# Patient Record
Sex: MALE | Race: WHITE | NOT HISPANIC OR LATINO | Employment: FULL TIME | URBAN - METROPOLITAN AREA
[De-identification: names, ages, dates, MRNs, and addresses within clinical notes are randomized per-mention and may not be internally consistent; named-entity substitution may affect disease eponyms.]

---

## 2017-03-28 RX ORDER — METOPROLOL SUCCINATE 50 MG/1
TABLET, EXTENDED RELEASE ORAL
Qty: 60 TABLET | Refills: 2 | Status: SHIPPED | OUTPATIENT
Start: 2017-03-28 | End: 2017-06-28 | Stop reason: SDUPTHER

## 2017-03-28 RX ORDER — RAMIPRIL 5 MG/1
CAPSULE ORAL
Qty: 30 CAPSULE | Refills: 2 | Status: SHIPPED | OUTPATIENT
Start: 2017-03-28 | End: 2017-06-28 | Stop reason: SDUPTHER

## 2017-06-28 RX ORDER — METOPROLOL SUCCINATE 50 MG/1
TABLET, EXTENDED RELEASE ORAL
Qty: 60 TABLET | Refills: 0 | Status: SHIPPED | OUTPATIENT
Start: 2017-06-28 | End: 2017-10-16 | Stop reason: SDUPTHER

## 2017-06-28 RX ORDER — RAMIPRIL 5 MG/1
CAPSULE ORAL
Qty: 30 CAPSULE | Refills: 0 | Status: SHIPPED | OUTPATIENT
Start: 2017-06-28 | End: 2017-10-16 | Stop reason: SDUPTHER

## 2017-09-25 RX ORDER — ATORVASTATIN CALCIUM 80 MG/1
TABLET, FILM COATED ORAL
Qty: 90 TABLET | Refills: 0 | Status: SHIPPED | OUTPATIENT
Start: 2017-09-25 | End: 2018-06-22

## 2017-10-16 RX ORDER — RAMIPRIL 5 MG/1
CAPSULE ORAL
Qty: 30 CAPSULE | Refills: 0 | Status: SHIPPED | OUTPATIENT
Start: 2017-10-16 | End: 2017-11-19 | Stop reason: SDUPTHER

## 2017-10-16 RX ORDER — METOPROLOL SUCCINATE 50 MG/1
TABLET, EXTENDED RELEASE ORAL
Qty: 60 TABLET | Refills: 0 | Status: SHIPPED | OUTPATIENT
Start: 2017-10-16 | End: 2017-11-19 | Stop reason: SDUPTHER

## 2017-11-20 RX ORDER — RAMIPRIL 5 MG/1
CAPSULE ORAL
Qty: 30 CAPSULE | Refills: 0 | Status: SHIPPED | OUTPATIENT
Start: 2017-11-20 | End: 2018-01-24 | Stop reason: SDUPTHER

## 2017-11-20 RX ORDER — METOPROLOL SUCCINATE 50 MG/1
TABLET, EXTENDED RELEASE ORAL
Qty: 60 TABLET | Refills: 0 | Status: SHIPPED | OUTPATIENT
Start: 2017-11-20 | End: 2018-01-24 | Stop reason: SDUPTHER

## 2018-01-24 ENCOUNTER — OFFICE VISIT (OUTPATIENT)
Dept: FAMILY MEDICINE CLINIC | Facility: CLINIC | Age: 63
End: 2018-01-24

## 2018-01-24 VITALS
TEMPERATURE: 97.5 F | SYSTOLIC BLOOD PRESSURE: 138 MMHG | OXYGEN SATURATION: 93 % | HEIGHT: 72 IN | HEART RATE: 73 BPM | WEIGHT: 280 LBS | DIASTOLIC BLOOD PRESSURE: 84 MMHG | BODY MASS INDEX: 37.93 KG/M2

## 2018-01-24 DIAGNOSIS — R73.01 IMPAIRED FASTING GLUCOSE: ICD-10-CM

## 2018-01-24 DIAGNOSIS — E78.49 OTHER HYPERLIPIDEMIA: ICD-10-CM

## 2018-01-24 DIAGNOSIS — N40.0 BENIGN PROSTATIC HYPERPLASIA WITHOUT LOWER URINARY TRACT SYMPTOMS: Primary | ICD-10-CM

## 2018-01-24 DIAGNOSIS — I10 BENIGN ESSENTIAL HYPERTENSION: ICD-10-CM

## 2018-01-24 LAB
ALBUMIN SERPL-MCNC: 4.4 G/DL (ref 3.5–5.2)
ALBUMIN/GLOB SERPL: 1.4 G/DL
ALP SERPL-CCNC: 80 U/L (ref 39–117)
ALT SERPL-CCNC: 25 U/L (ref 1–41)
AST SERPL-CCNC: 25 U/L (ref 1–40)
BILIRUB SERPL-MCNC: 0.3 MG/DL (ref 0.1–1.2)
BUN SERPL-MCNC: 21 MG/DL (ref 8–23)
BUN/CREAT SERPL: 18.9 (ref 7–25)
CALCIUM SERPL-MCNC: 9.2 MG/DL (ref 8.6–10.5)
CHLORIDE SERPL-SCNC: 101 MMOL/L (ref 98–107)
CHOLEST SERPL-MCNC: 170 MG/DL (ref 0–200)
CO2 SERPL-SCNC: 27.1 MMOL/L (ref 22–29)
CREAT SERPL-MCNC: 1.11 MG/DL (ref 0.76–1.27)
GFR SERPLBLD CREATININE-BSD FMLA CKD-EPI: 67 ML/MIN/1.73
GFR SERPLBLD CREATININE-BSD FMLA CKD-EPI: 81 ML/MIN/1.73
GLOBULIN SER CALC-MCNC: 3.1 GM/DL
GLUCOSE SERPL-MCNC: 104 MG/DL (ref 65–99)
HBA1C MFR BLD: 5.6 % (ref 4.8–5.6)
HDLC SERPL-MCNC: 36 MG/DL (ref 40–60)
LDLC SERPL CALC-MCNC: 98 MG/DL (ref 0–100)
POTASSIUM SERPL-SCNC: 4.6 MMOL/L (ref 3.5–5.2)
PROT SERPL-MCNC: 7.5 G/DL (ref 6–8.5)
PSA SERPL-MCNC: 2.3 NG/ML (ref 0–4)
SODIUM SERPL-SCNC: 140 MMOL/L (ref 136–145)
TRIGL SERPL-MCNC: 179 MG/DL (ref 0–150)
VLDLC SERPL CALC-MCNC: 35.8 MG/DL (ref 5–40)

## 2018-01-24 PROCEDURE — 69209 REMOVE IMPACTED EAR WAX UNI: CPT | Performed by: NURSE PRACTITIONER

## 2018-01-24 PROCEDURE — 99214 OFFICE O/P EST MOD 30 MIN: CPT | Performed by: NURSE PRACTITIONER

## 2018-01-24 RX ORDER — METOPROLOL SUCCINATE 50 MG/1
100 TABLET, EXTENDED RELEASE ORAL DAILY
Qty: 60 TABLET | Refills: 5 | Status: SHIPPED | OUTPATIENT
Start: 2018-01-24 | End: 2018-11-04 | Stop reason: SDUPTHER

## 2018-01-24 RX ORDER — RAMIPRIL 5 MG/1
5 CAPSULE ORAL DAILY
Qty: 30 CAPSULE | Refills: 5 | Status: SHIPPED | OUTPATIENT
Start: 2018-01-24 | End: 2018-11-04 | Stop reason: SDUPTHER

## 2018-01-24 NOTE — PROGRESS NOTES
"Subjective   Gordon Gutierrez is a 62 y.o. male who presents for a follow up hypertension, hyperlipidemia. Here for routine check up and blood work and would like ears cleaned out as well r/t being clogged up.  History of Present Illness   No acute complaints. Denies chest pain, sees Dr. Kevin annually for follow up.   The following portions of the patient's history were reviewed and updated as appropriate: allergies, current medications, past family history, past medical history, past social history, past surgical history and problem list.    Review of Systems   Constitutional: Negative.    HENT: Positive for hearing loss (due to ears being clogged up-supposed to have cleaned twice year ).    Respiratory: Negative.    Cardiovascular: Negative.         Denies any need for ntg sl r/t never had chest pain but carries r/t Dr. Kevin providing them.   Gastrointestinal: Negative.    Endocrine: Negative.    Genitourinary: Negative.    Musculoskeletal: Positive for arthralgias (resolves upon movement-takes osteobiflex) and myalgias (normal joint/muscle pains upon wakening from bed-resolves upon movement).   Allergic/Immunologic: Negative.    Neurological: Negative.    Hematological: Negative.    Psychiatric/Behavioral: Negative.    /84  Pulse 73  Temp 97.5 °F (36.4 °C) (Oral)   Ht 182.9 cm (72\")  Wt 127 kg (280 lb)  SpO2 93%  BMI 37.97 kg/m2      Objective   Physical Exam   Constitutional: He is oriented to person, place, and time. He appears well-developed and well-nourished.   HENT:   Head: Normocephalic and atraumatic.   Right Ear: External ear normal. No swelling or tenderness.   Left Ear: External ear normal. No swelling or tenderness.   Mouth/Throat: Oropharynx is clear and moist.   hannah cerumen plugs     Neck: Normal range of motion. Neck supple. No thyromegaly present.   Cardiovascular: Normal rate, regular rhythm and normal heart sounds.    Pulses:       Carotid pulses are 2+ on the right side, and 2+ on the " left side.  Pulmonary/Chest: Effort normal and breath sounds normal.   Lymphadenopathy:     He has no cervical adenopathy.   Neurological: He is alert and oriented to person, place, and time.   Skin: Skin is warm and dry. He is not diaphoretic.   Psychiatric: He has a normal mood and affect. His behavior is normal. Judgment and thought content normal.   Vitals reviewed.    Assessment/Plan   Problems Addressed this Visit        Cardiovascular and Mediastinum    Benign essential hypertension    Relevant Medications    metoprolol succinate XL (TOPROL-XL) 50 MG 24 hr tablet    ramipril (ALTACE) 5 MG capsule    Other Relevant Orders    Comprehensive Metabolic Panel    Hyperlipidemia    Relevant Orders    Lipid panel       Endocrine    Impaired fasting glucose    Relevant Orders    Hemoglobin A1c       Genitourinary    Benign prostatic hyperplasia - Primary    Relevant Orders    PSA Screen        Ears clear readily with irrigation. No trauma noted.   Update labs, consider crestor if lipids not fully controlled. With LDL<70  Update A1c, recommend diet and weight loss.   FU annually and prn

## 2018-01-25 NOTE — PROGRESS NOTES
Please call the patient regarding his abnormal result. Blood glucose with mild elevation, but no diabetes. Prostate number normal. Cholesterol only mildly elevated, but not to goal. Consider switching atorvastatin to crestor 20mg 1 po qd #30 5rf, recheck lipids with cmp in 6 months, otherwise 1 yr follow up.

## 2018-01-29 ENCOUNTER — TELEPHONE (OUTPATIENT)
Dept: FAMILY MEDICINE CLINIC | Facility: CLINIC | Age: 63
End: 2018-01-29

## 2018-01-29 NOTE — TELEPHONE ENCOUNTER
----- Message from JORDANA Christian sent at 1/25/2018  8:07 AM EST -----  Please call the patient regarding his abnormal result. Blood glucose with mild elevation, but no diabetes. Prostate number normal. Cholesterol only mildly elevated, but not to goal. Consider switching atorvastatin to crestor 20mg 1 po qd #30 5rf, recheck lipids with cmp in 6 months, otherwise 1 yr follow up.

## 2018-01-29 NOTE — TELEPHONE ENCOUNTER
Patient notified of results. He states he will switch to crestor once the current 90 day supply of atorvastatin that he has on hand runs out. He will call when he needs rx changed.

## 2018-06-22 RX ORDER — ROSUVASTATIN CALCIUM 20 MG/1
20 TABLET, COATED ORAL DAILY
Qty: 90 TABLET | Refills: 0 | Status: SHIPPED | OUTPATIENT
Start: 2018-06-22 | End: 2018-11-04 | Stop reason: SDUPTHER

## 2018-06-22 RX ORDER — ATORVASTATIN CALCIUM 80 MG/1
TABLET, FILM COATED ORAL
Qty: 90 TABLET | Refills: 0 | OUTPATIENT
Start: 2018-06-22

## 2018-11-04 DIAGNOSIS — I10 BENIGN ESSENTIAL HYPERTENSION: ICD-10-CM

## 2018-11-05 RX ORDER — RAMIPRIL 5 MG/1
CAPSULE ORAL
Qty: 90 CAPSULE | Refills: 0 | Status: SHIPPED | OUTPATIENT
Start: 2018-11-05 | End: 2019-04-17 | Stop reason: SDUPTHER

## 2018-11-05 RX ORDER — ROSUVASTATIN CALCIUM 20 MG/1
TABLET, COATED ORAL
Qty: 90 TABLET | Refills: 0 | Status: SHIPPED | OUTPATIENT
Start: 2018-11-05 | End: 2019-04-17

## 2018-11-05 RX ORDER — METOPROLOL SUCCINATE 50 MG/1
TABLET, EXTENDED RELEASE ORAL
Qty: 180 TABLET | Refills: 0 | Status: SHIPPED | OUTPATIENT
Start: 2018-11-05 | End: 2019-04-17 | Stop reason: SDUPTHER

## 2019-04-06 DIAGNOSIS — I10 BENIGN ESSENTIAL HYPERTENSION: ICD-10-CM

## 2019-04-09 RX ORDER — METOPROLOL SUCCINATE 50 MG/1
TABLET, EXTENDED RELEASE ORAL
Qty: 60 TABLET | Refills: 0 | OUTPATIENT
Start: 2019-04-09

## 2019-04-09 RX ORDER — RAMIPRIL 5 MG/1
CAPSULE ORAL
Qty: 30 CAPSULE | Refills: 0 | OUTPATIENT
Start: 2019-04-09

## 2019-04-17 ENCOUNTER — OFFICE VISIT (OUTPATIENT)
Dept: FAMILY MEDICINE CLINIC | Facility: CLINIC | Age: 64
End: 2019-04-17

## 2019-04-17 VITALS
TEMPERATURE: 98.2 F | HEIGHT: 72 IN | WEIGHT: 274 LBS | DIASTOLIC BLOOD PRESSURE: 68 MMHG | SYSTOLIC BLOOD PRESSURE: 124 MMHG | OXYGEN SATURATION: 98 % | BODY MASS INDEX: 37.11 KG/M2 | HEART RATE: 60 BPM

## 2019-04-17 DIAGNOSIS — B36.9 FUNGAL DERMATITIS: ICD-10-CM

## 2019-04-17 DIAGNOSIS — N40.0 BENIGN PROSTATIC HYPERPLASIA WITHOUT LOWER URINARY TRACT SYMPTOMS: ICD-10-CM

## 2019-04-17 DIAGNOSIS — I10 BENIGN ESSENTIAL HYPERTENSION: Primary | ICD-10-CM

## 2019-04-17 LAB — PSA SERPL-MCNC: 2.89 NG/ML (ref 0–4)

## 2019-04-17 PROCEDURE — 99214 OFFICE O/P EST MOD 30 MIN: CPT | Performed by: NURSE PRACTITIONER

## 2019-04-17 RX ORDER — RAMIPRIL 5 MG/1
5 CAPSULE ORAL DAILY
Qty: 90 CAPSULE | Refills: 3 | Status: SHIPPED | OUTPATIENT
Start: 2019-04-17 | End: 2020-06-16

## 2019-04-17 RX ORDER — BETAMETHASONE DIPROPIONATE 0.5 MG/G
CREAM TOPICAL 2 TIMES DAILY
Qty: 15 G | Refills: 1 | Status: SHIPPED | OUTPATIENT
Start: 2019-04-17 | End: 2020-03-16

## 2019-04-17 RX ORDER — ROSUVASTATIN CALCIUM 20 MG/1
20 TABLET, COATED ORAL DAILY
Qty: 90 TABLET | Refills: 3 | Status: SHIPPED | OUTPATIENT
Start: 2019-04-17 | End: 2020-06-16

## 2019-04-17 RX ORDER — CLOTRIMAZOLE 1 %
CREAM (GRAM) TOPICAL 2 TIMES DAILY
Qty: 24 G | Refills: 0 | Status: SHIPPED | OUTPATIENT
Start: 2019-04-17 | End: 2020-03-16

## 2019-04-17 RX ORDER — METOPROLOL SUCCINATE 50 MG/1
100 TABLET, EXTENDED RELEASE ORAL DAILY
Qty: 180 TABLET | Refills: 3 | Status: SHIPPED | OUTPATIENT
Start: 2019-04-17 | End: 2020-06-16

## 2019-04-17 NOTE — PROGRESS NOTES
"Subjective   Gordon Gutierrez is a 63 y.o. male who presents for a routine follow up on hypertension.     Hypertension   This is a chronic problem. The current episode started more than 1 year ago. The problem is unchanged. The problem is controlled. Associated symptoms include palpitations (had testing with cardiology, told all normal, occurs intermittently). Pertinent negatives include no chest pain, peripheral edema or shortness of breath. Agents associated with hypertension include NSAIDs. Risk factors for coronary artery disease include dyslipidemia, family history, male gender, obesity and smoking/tobacco exposure. Past treatments include beta blockers and ACE inhibitors. Current antihypertension treatment includes beta blockers and ACE inhibitors. The current treatment provides significant improvement. Compliance problems include exercise.  Hypertensive end-organ damage includes CAD/MI.    place under arm x 4 months itching    The following portions of the patient's history were reviewed and updated as appropriate: allergies, current medications, past family history, past medical history, past social history, past surgical history and problem list.    Review of Systems   Constitutional: Positive for activity change (working 3 days a week. ). Negative for fever and unexpected weight gain.   HENT: Negative.    Respiratory: Negative for shortness of breath.    Cardiovascular: Positive for palpitations (had testing with cardiology, told all normal, occurs intermittently). Negative for chest pain.   Gastrointestinal: Negative.    Genitourinary: Negative for difficulty urinating, hematuria and nocturia.   Musculoskeletal: Positive for arthralgias.   Skin: Positive for skin lesions.   Neurological: Negative.    Psychiatric/Behavioral: Negative.      /68   Pulse 60   Temp 98.2 °F (36.8 °C) (Oral)   Ht 182.9 cm (72\")   Wt 124 kg (274 lb)   SpO2 98%   BMI 37.16 kg/m²     Objective   Physical Exam   Constitutional: " He is oriented to person, place, and time. He appears well-developed and well-nourished.   Neck: Normal range of motion. Neck supple. No tracheal deviation present. No thyromegaly present.   Cardiovascular: Normal rate, regular rhythm and normal heart sounds.   Pulses:       Carotid pulses are 2+ on the right side, and 2+ on the left side.  Pulmonary/Chest: Effort normal and breath sounds normal.   Lymphadenopathy:     He has no cervical adenopathy.   Neurological: He is alert and oriented to person, place, and time.   Skin: Skin is warm and dry. Capillary refill takes less than 2 seconds. There is erythema.   Macerated area to R axilla with redness   Psychiatric: He has a normal mood and affect. His behavior is normal. Judgment and thought content normal.   Nursing note and vitals reviewed.    Assessment/Plan   Problems Addressed this Visit        Cardiovascular and Mediastinum    Benign essential hypertension - Primary    Relevant Medications    ramipril (ALTACE) 5 MG capsule    metoprolol succinate XL (TOPROL-XL) 50 MG 24 hr tablet       Genitourinary    Benign prostatic hyperplasia    Relevant Orders    PSA DIAGNOSTIC      Other Visit Diagnoses     Fungal dermatitis        Relevant Medications    clotrimazole (LOTRIMIN) 1 % cream    betamethasone dipropionate (DIPROLENE) 0.05 % cream        HTN--Reviewed cardiology notes--Had holter with sinus arrythmia and PACs, Echo with EF 61% mild valvular dysfunction. TSH was normal. Cholesterol unchanged on crestor. Will continue.   BPH--update PSA  Fungal dermatitis--treat for 2 weeks, follow up if not settling.  Otherwise annual FU    Patient has been erroneously marked as diabetic. Based on the available clinical information, he does not have diabetes and should therefore be excluded from diabetic health maintenance and quality measures for the remainder of the reporting period.

## 2020-03-16 ENCOUNTER — OFFICE VISIT (OUTPATIENT)
Dept: FAMILY MEDICINE CLINIC | Facility: CLINIC | Age: 65
End: 2020-03-16

## 2020-03-16 VITALS
HEART RATE: 68 BPM | SYSTOLIC BLOOD PRESSURE: 134 MMHG | DIASTOLIC BLOOD PRESSURE: 82 MMHG | BODY MASS INDEX: 39.28 KG/M2 | WEIGHT: 290 LBS | HEIGHT: 72 IN | OXYGEN SATURATION: 97 % | TEMPERATURE: 98.6 F

## 2020-03-16 DIAGNOSIS — E78.49 OTHER HYPERLIPIDEMIA: ICD-10-CM

## 2020-03-16 DIAGNOSIS — M79.662 BILATERAL CALF PAIN: ICD-10-CM

## 2020-03-16 DIAGNOSIS — E66.01 MORBID OBESITY (HCC): ICD-10-CM

## 2020-03-16 DIAGNOSIS — E55.9 VITAMIN D DEFICIENCY: ICD-10-CM

## 2020-03-16 DIAGNOSIS — K76.0 STEATOSIS OF LIVER: ICD-10-CM

## 2020-03-16 DIAGNOSIS — Z00.00 WELCOME TO MEDICARE PREVENTIVE VISIT: ICD-10-CM

## 2020-03-16 DIAGNOSIS — G47.33 OSA (OBSTRUCTIVE SLEEP APNEA): ICD-10-CM

## 2020-03-16 DIAGNOSIS — I25.10 CHRONIC CORONARY ARTERY DISEASE: Primary | ICD-10-CM

## 2020-03-16 DIAGNOSIS — M79.661 BILATERAL CALF PAIN: ICD-10-CM

## 2020-03-16 DIAGNOSIS — R73.01 IMPAIRED FASTING GLUCOSE: ICD-10-CM

## 2020-03-16 DIAGNOSIS — I47.1 PAROXYSMAL SUPRAVENTRICULAR TACHYCARDIA (HCC): ICD-10-CM

## 2020-03-16 DIAGNOSIS — Z23 IMMUNIZATION DUE: ICD-10-CM

## 2020-03-16 DIAGNOSIS — N40.0 BENIGN PROSTATIC HYPERPLASIA WITHOUT LOWER URINARY TRACT SYMPTOMS: ICD-10-CM

## 2020-03-16 DIAGNOSIS — Z87.891 FORMER SMOKER: ICD-10-CM

## 2020-03-16 LAB
25(OH)D3+25(OH)D2 SERPL-MCNC: 24.6 NG/ML (ref 30–100)
ALBUMIN SERPL-MCNC: 4.1 G/DL (ref 3.5–5.2)
ALBUMIN/GLOB SERPL: 1.3 G/DL
ALP SERPL-CCNC: 66 U/L (ref 39–117)
ALT SERPL-CCNC: 39 U/L (ref 1–41)
AST SERPL-CCNC: 32 U/L (ref 1–40)
BASOPHILS # BLD AUTO: ABNORMAL 10*3/UL
BASOPHILS # BLD MANUAL: 0 10*3/MM3 (ref 0–0.2)
BASOPHILS NFR BLD MANUAL: 0 % (ref 0–1.5)
BILIRUB SERPL-MCNC: 0.2 MG/DL (ref 0.2–1.2)
BUN SERPL-MCNC: 24 MG/DL (ref 8–23)
BUN/CREAT SERPL: 17 (ref 7–25)
CALCIUM SERPL-MCNC: 8.6 MG/DL (ref 8.6–10.5)
CHLORIDE SERPL-SCNC: 106 MMOL/L (ref 98–107)
CHOLEST SERPL-MCNC: 162 MG/DL (ref 0–200)
CO2 SERPL-SCNC: 23.1 MMOL/L (ref 22–29)
CREAT SERPL-MCNC: 1.41 MG/DL (ref 0.76–1.27)
DIFFERENTIAL COMMENT: NORMAL
EOSINOPHIL # BLD AUTO: ABNORMAL 10*3/UL
EOSINOPHIL # BLD MANUAL: 0.29 10*3/MM3 (ref 0–0.4)
EOSINOPHIL NFR BLD AUTO: ABNORMAL %
EOSINOPHIL NFR BLD MANUAL: 4 % (ref 0.3–6.2)
ERYTHROCYTE [DISTWIDTH] IN BLOOD BY AUTOMATED COUNT: 13.9 % (ref 12.3–15.4)
GLOBULIN SER CALC-MCNC: 3.2 GM/DL
GLUCOSE SERPL-MCNC: 114 MG/DL (ref 65–99)
HBA1C MFR BLD: 7.1 % (ref 4.8–5.6)
HCT VFR BLD AUTO: 33.4 % (ref 37.5–51)
HDLC SERPL-MCNC: 32 MG/DL (ref 40–60)
HGB BLD-MCNC: 11.3 G/DL (ref 13–17.7)
LDLC SERPL CALC-MCNC: 84 MG/DL (ref 0–100)
LYMPHOCYTES # BLD AUTO: ABNORMAL 10*3/UL
LYMPHOCYTES # BLD MANUAL: 2.32 10*3/MM3 (ref 0.7–3.1)
LYMPHOCYTES NFR BLD AUTO: ABNORMAL %
LYMPHOCYTES NFR BLD MANUAL: 32 % (ref 19.6–45.3)
MAGNESIUM SERPL-MCNC: 2 MG/DL (ref 1.6–2.4)
MCH RBC QN AUTO: 30.2 PG (ref 26.6–33)
MCHC RBC AUTO-ENTMCNC: 33.8 G/DL (ref 31.5–35.7)
MCV RBC AUTO: 89.3 FL (ref 79–97)
MONOCYTES # BLD MANUAL: 0.8 10*3/MM3 (ref 0.1–0.9)
MONOCYTES NFR BLD AUTO: ABNORMAL %
MONOCYTES NFR BLD MANUAL: 11 % (ref 5–12)
NEUTROPHILS # BLD MANUAL: 3.84 10*3/MM3 (ref 1.7–7)
NEUTROPHILS NFR BLD AUTO: ABNORMAL %
NEUTROPHILS NFR BLD MANUAL: 53 % (ref 42.7–76)
PLATELET # BLD AUTO: 206 10*3/MM3 (ref 140–450)
PLATELET BLD QL SMEAR: NORMAL
POTASSIUM SERPL-SCNC: 4.6 MMOL/L (ref 3.5–5.2)
PROT SERPL-MCNC: 7.3 G/DL (ref 6–8.5)
PSA SERPL-MCNC: 2.31 NG/ML (ref 0–4)
RBC # BLD AUTO: 3.74 10*6/MM3 (ref 4.14–5.8)
RBC MORPH BLD: NORMAL
SODIUM SERPL-SCNC: 142 MMOL/L (ref 136–145)
TRIGL SERPL-MCNC: 229 MG/DL (ref 0–150)
VLDLC SERPL CALC-MCNC: 45.8 MG/DL
WBC # BLD AUTO: 7.24 10*3/MM3 (ref 3.4–10.8)

## 2020-03-16 PROCEDURE — G0402 INITIAL PREVENTIVE EXAM: HCPCS | Performed by: NURSE PRACTITIONER

## 2020-03-16 PROCEDURE — 99214 OFFICE O/P EST MOD 30 MIN: CPT | Performed by: NURSE PRACTITIONER

## 2020-03-16 RX ORDER — ROSUVASTATIN CALCIUM 20 MG/1
20 TABLET, COATED ORAL DAILY
COMMUNITY
Start: 2018-11-05 | End: 2020-03-16 | Stop reason: SDUPTHER

## 2020-03-16 NOTE — PATIENT INSTRUCTIONS
Medicare Wellness  Personal Prevention Plan of Service     Date of Office Visit:  2020  Encounter Provider:  JORDANA Moon  Place of Service:  [facility]  Patient Name: Gordon Gutierrez  :  1955    As part of the Medicare Wellness portion of your visit today, we are providing you with this personalized preventive plan of services (PPPS). This plan is based upon recommendations of the [place] Preventive Services Task Force (USPSTF) and the Advisory Committee on Immunization Practices (ACIP).    This lists the preventive care services that should be considered, and provides dates of when you are due. Items listed as completed are up-to-date and do not require any further intervention.    Health Maintenance   Topic Date Due   • DIABETIC FOOT EXAM  2017   • URINE MICROALBUMIN  2017   • HEMOGLOBIN A1C  2018   • Pneumococcal Vaccine Once at 65 Years Old  2019   • LIPID PANEL  2019   • AAA SCREEN (ONE-TIME)  2020   • DIABETIC EYE EXAM  2020   • MEDICARE ANNUAL WELLNESS  2021   • COLONOSCOPY  2024   • TDAP/TD VACCINES (3 - Td) 2027   • INFLUENZA VACCINE  Completed   • HEPATITIS C SCREENING  Addressed       Orders Placed This Encounter   Procedures   • Comprehensive Metabolic Panel   • Hemoglobin A1c   • Lipid Panel   • PSA Screen   • Vitamin D 25 Hydroxy   • Magnesium   • CBC & Differential     Order Specific Question:   Manual Differential     Answer:   No       No follow-ups on file.

## 2020-03-16 NOTE — PROGRESS NOTES
The ABCs of the Annual Wellness Visit  [place] to Medicare Visit    No chief complaint on file.      Subjective   History of Present Illness:  Gordon Gutierrez is a 64 y.o. male who presents for a Welcome Medicare Wellness Visit.    HEALTH RISK ASSESSMENT    Recent Hospitalizations:  No hospitalization(s) within the last year.    Current Medical Providers:  Patient Care Team:  Arden Olivares DO as PCP - General (Family Medicine)    Smoking Status:  Social History     Tobacco Use   Smoking Status Former Smoker   • Last attempt to quit: 1996   • Years since quittin.1       Alcohol Consumption:  Social History     Substance and Sexual Activity   Alcohol Use Yes       Depression Screen:   PHQ-2/PHQ-9 Depression Screening 3/16/2020   Little interest or pleasure in doing things 0   Feeling down, depressed, or hopeless 0   Trouble falling or staying asleep, or sleeping too much 2   Feeling tired or having little energy 3   Poor appetite or overeating 0   Feeling bad about yourself - or that you are a failure or have let yourself or your family down 0   Trouble concentrating on things, such as reading the newspaper or watching television 0   Moving or speaking so slowly that other people could have noticed. Or the opposite - being so fidgety or restless that you have been moving around a lot more than usual 0   Thoughts that you would be better off dead, or of hurting yourself in some way 0   Total Score 5   If you checked off any problems, how difficult have these problems made it for you to do your work, take care of things at home, or get along with other people? Somewhat difficult       Fall Risk Screen:  STEADI Fall Risk Assessment has not been completed.    Health Habits and Functional and Cognitive Screening:  Functional & Cognitive Status 3/16/2020   Do you have difficulty preparing food and eating? No   Do you have difficulty bathing yourself, getting dressed or grooming yourself? No   Do you have difficulty  using the toilet? No   Do you have difficulty moving around from place to place? No   Do you have trouble with steps or getting out of a bed or a chair? No   Current Diet Unhealthy Diet   Dental Exam Up to date   Eye Exam Up to date   Exercise (times per week) 0 times per week   Current Exercise Activities Include No Regular Exercise   Do you need help using the phone?  No   Are you deaf or do you have serious difficulty hearing?  No   Do you need help with transportation? No   Do you need help shopping? No   Do you need help preparing meals?  No   Do you need help with housework?  No   Do you need help with laundry? No   Do you need help taking your medications? No   Do you need help managing money? No   Do you ever drive or ride in a car without wearing a seat belt? Yes   Have you felt unusual stress, anger or loneliness in the last month? No   Who do you live with? Spouse   If you need help, do you have trouble finding someone available to you? No   Have you been bothered in the last four weeks by sexual problems? No   Do you have difficulty concentrating, remembering or making decisions? No         Does the patient have evidence of cognitive impairment? No    Asprin use counseling:Taking ASA appropriately as indicated    Age-appropriate Screening Schedule:  Refer to the list below for future screening recommendations based on patient's age, sex and/or medical conditions. Orders for these recommended tests are listed in the plan section. The patient has been provided with a written plan.    Health Maintenance   Topic Date Due   • DIABETIC FOOT EXAM  11/08/2017   • URINE MICROALBUMIN  11/08/2017   • HEMOGLOBIN A1C  07/24/2018   • LIPID PANEL  12/31/2019   • DIABETIC EYE EXAM  09/01/2020   • COLONOSCOPY  01/01/2024   • TDAP/TD VACCINES (3 - Td) 06/12/2027   • INFLUENZA VACCINE  Completed          The following portions of the patient's history were reviewed and updated as appropriate: allergies, current  medications, past family history, past medical history, past social history, past surgical history and problem list.    Outpatient Medications Prior to Visit   Medication Sig Dispense Refill   • aspirin 81 MG tablet Take by mouth.     • magnesium oxide (MAGOX) 400 (241.3 Mg) MG tablet tablet Take 400 mg by mouth Daily.     • metoprolol succinate XL (TOPROL-XL) 50 MG 24 hr tablet Take 2 tablets by mouth Daily. 180 tablet 3   • Misc Natural Products (OSTEO BI-FLEX JOINT SHIELD PO) Take  by mouth.     • nitroglycerin (NITROSTAT) 0.4 MG SL tablet Place 0.4 mg under the tongue Every 5 (Five) Minutes.     • ramipril (ALTACE) 5 MG capsule Take 1 capsule by mouth Daily. 90 capsule 3   • rosuvastatin (CRESTOR) 20 MG tablet Take 1 tablet by mouth Daily. 90 tablet 3   • rosuvastatin (CRESTOR) 20 MG tablet Take 20 mg by mouth Daily.     • betamethasone dipropionate (DIPROLENE) 0.05 % cream Apply  topically to the appropriate area as directed 2 (Two) Times a Day. 15 g 1   • clotrimazole (LOTRIMIN) 1 % cream Apply  topically to the appropriate area as directed 2 (Two) Times a Day. 24 g 0   • Diclofenac (ZORVOLEX) 18 MG capsule        No facility-administered medications prior to visit.        Patient Active Problem List   Diagnosis   • Abdominal pain   • Angina pectoris (CMS/HCC)   • Aortic valve insufficiency   • Chronic coronary artery disease   • Benign essential hypertension   • Benign prostatic hyperplasia   • Cardiomyopathy (CMS/HCC)   • Dyslipidemia   • Erectile dysfunction of nonorganic origin   • Steatosis of liver   • Flank pain   • Generalized osteoarthritis   • Hematuria   • Impaired fasting glucose   • Low back pain   • Morbid obesity (CMS/HCC)   • Paroxysmal supraventricular tachycardia (CMS/HCC)   • Tricuspid valve insufficiency   • Vitamin D deficiency   • ISIS (obstructive sleep apnea)   • Dyspnea on exertion   • Hyperlipidemia       Advanced Care Planning:  ACP discussion was held with the patient during this  "visit. Patient has an advance directive (not in EMR), copy requested.    Review of Systems    Compared to one year ago, the patient feels his physical health is the same.  Compared to one year ago, the patient feels his mental health is the same.    Reviewed chart for potential of high risk medication in the elderly: yes  Reviewed chart for potential of harmful drug interactions in the elderly:yes    Objective         Vitals:    03/16/20 0845   BP: 134/82   Pulse: 68   Temp: 98.6 °F (37 °C)   TempSrc: Oral   SpO2: 97%   Weight: 132 kg (290 lb)   Height: 182.9 cm (72\")   PainSc:   3   PainLoc: Leg       Body mass index is 39.33 kg/m².  Discussed the patient's BMI with him. The BMI is above average; BMI management plan is completed.    Physical Exam    Lab Results   Component Value Date     (H) 03/11/2020        Assessment/Plan   Medicare Risks and Personalized Health Plan  CMS Preventative Services Quick Reference  Abdominal Aortic Aneurysm Screening  Advance Directive Discussion  Cardiovascular risk  Diabetic Lab Screening   Immunizations Discussed/Encouraged (specific immunizations; Pneumococcal 23 )    The above risks/problems have been discussed with the patient.  Pertinent information has been shared with the patient in the After Visit Summary.  Follow up plans and orders are seen below in the Assessment/Plan Section.    Diagnoses and all orders for this visit:    1. Chronic coronary artery disease (Primary)  -     CBC & Differential  -     Comprehensive Metabolic Panel    2. Other hyperlipidemia  -     Comprehensive Metabolic Panel  -     Lipid Panel    3. Steatosis of liver    4. Morbid obesity (CMS/HCC)    5. Vitamin D deficiency  -     Vitamin D 25 Hydroxy    6. Impaired fasting glucose  -     Hemoglobin A1c    7. Benign prostatic hyperplasia without lower urinary tract symptoms  -     PSA Screen    8. Paroxysmal supraventricular tachycardia (CMS/HCC)  -     Magnesium    9. ISIS (obstructive sleep " apnea)    10. Immunization due  -     pneumococcal polysaccharide 23-valent (PNEUMOVAX-23) vaccine 0.5 mL    11. Former smoker  -     Abdominal Aortic Aneurysm Screening Medicare CAR; Future    12. Welcome to Medicare preventive visit      Follow Up:  No follow-ups on file.     An After Visit Summary and PPPS were given to the patient.

## 2020-03-16 NOTE — PROGRESS NOTES
"Subjective   Gordon Gutierrez is a 64 y.o. male who presents for a routine follow up on hypertension, hyperlipidemia.     History of Present Illness   Having increased issues with SVT that occurs up to 5-6 x daily, though less since quit caffeine. Having fatigue and lack of motivation to rehab shoulder from rotator cuff injury.   Reviewed his labs from cardiology and kidney function is declining--47, has hematuria in the past--had scopes and told negative.   Legs bothering him, sore in calves and shins--no swelling x 2 weeks. More sore with walking, but hurts from time starts walking until quits. No back pain, but R shoulder hurts with walk.   Did smoke just over 30 pack years, quit in 1996  The following portions of the patient's history were reviewed and updated as appropriate: allergies, current medications, past family history, past medical history, past social history, past surgical history and problem list.    Review of Systems   Constitutional: Positive for activity change and fatigue. Negative for chills, fever and unexpected weight gain.   HENT: Negative.    Eyes: Negative.    Respiratory: Negative.    Cardiovascular: Positive for palpitations. Negative for chest pain and leg swelling.   Gastrointestinal: Negative.    Endocrine: Negative.    Genitourinary: Positive for nocturia (1 x nightly). Negative for difficulty urinating.   Musculoskeletal: Positive for arthralgias, back pain and myalgias. Negative for gait problem, joint swelling, neck pain and neck stiffness.   Allergic/Immunologic: Negative.    Neurological: Negative.    Hematological: Negative.    Psychiatric/Behavioral: Negative.      /82   Pulse 68   Temp 98.6 °F (37 °C) (Oral)   Ht 182.9 cm (72\")   Wt 132 kg (290 lb)   SpO2 97%   BMI 39.33 kg/m²     Objective   Physical Exam   Constitutional: He is oriented to person, place, and time. He appears well-developed and well-nourished.   Neck: Normal range of motion. Neck supple. No tracheal " deviation present. No thyromegaly present.   Cardiovascular: Normal rate, regular rhythm and normal heart sounds.   Pulses:       Carotid pulses are 2+ on the right side, and 2+ on the left side.  Pulmonary/Chest: Effort normal and breath sounds normal.   Musculoskeletal: He exhibits tenderness (calves mild tender, negative tiny). He exhibits no edema or deformity.   Lymphadenopathy:     He has no cervical adenopathy.   Neurological: He is alert and oriented to person, place, and time.   Skin: Skin is warm and dry. Capillary refill takes less than 2 seconds. No erythema.   Psychiatric: He has a normal mood and affect. His behavior is normal. Judgment and thought content normal.   Nursing note and vitals reviewed.        Assessment/Plan   Problems Addressed this Visit        Cardiovascular and Mediastinum    Chronic coronary artery disease - Primary    Relevant Orders    CBC & Differential    Comprehensive Metabolic Panel    Paroxysmal supraventricular tachycardia (CMS/HCC)    Relevant Orders    Magnesium    Hyperlipidemia    Relevant Orders    Comprehensive Metabolic Panel    Lipid Panel       Respiratory    ISIS (obstructive sleep apnea)       Digestive    Steatosis of liver    Morbid obesity (CMS/HCC)    Vitamin D deficiency    Relevant Orders    Vitamin D 25 Hydroxy       Endocrine    Impaired fasting glucose    Relevant Orders    Hemoglobin A1c       Genitourinary    Benign prostatic hyperplasia    Relevant Orders    PSA Screen      Other Visit Diagnoses     Immunization due        Relevant Medications    pneumococcal polysaccharide 23-valent (PNEUMOVAX-23) vaccine 0.5 mL (Completed)    Former smoker        Relevant Orders    Abdominal Aortic Aneurysm Screening Medicare CAR    Welcome to Medicare preventive visit        Bilateral calf pain            CAD--PSVT--continue work with cardiology. Continue BB, ACE, Magnesium and update Mg levels  HLD--goal LDL<70  ISIS--has pending sleep study. Consider doxepin as EMA.  Would hold for sleep study.  Steatosis--obesity--needs to work on diet and weight.  Vitamin D deficiency--consider restarting replacement--check levels  IFG--check A1c  BPH--minimal symptoms, check PSA  hannah calf pain--no swelling--varicosities are compressible nontender. No true claudication. Favor secondary to recent increase in physical activity. If not settling would consider TEA

## 2020-03-17 NOTE — PROGRESS NOTES
Please call the patient regarding his abnormal result. Anemic, recommend FOBT x 3, any history of colon polyp or GI bleeding? Conversion to full diabetes. Start metformin 500mg BID #60 5rf, kidney function depressed, stable. AVOID NSAIDS. Cholesterol still high, but improved. Continue crestor. PSA stable. FU annually and prn

## 2020-04-08 DIAGNOSIS — Z87.891 FORMER SMOKER: Primary | ICD-10-CM

## 2020-04-08 DIAGNOSIS — Z00.00 WELCOME TO MEDICARE PREVENTIVE VISIT: ICD-10-CM

## 2020-05-26 ENCOUNTER — HOSPITAL ENCOUNTER (OUTPATIENT)
Dept: ULTRASOUND IMAGING | Facility: HOSPITAL | Age: 65
Discharge: HOME | End: 2020-05-26
Admitting: NURSE PRACTITIONER

## 2020-05-26 DIAGNOSIS — Z00.00 WELCOME TO MEDICARE PREVENTIVE VISIT: ICD-10-CM

## 2020-05-26 DIAGNOSIS — Z87.891 FORMER SMOKER: ICD-10-CM

## 2020-05-26 PROCEDURE — 76706 US ABDL AORTA SCREEN AAA: CPT

## 2020-06-16 DIAGNOSIS — I10 BENIGN ESSENTIAL HYPERTENSION: ICD-10-CM

## 2020-06-16 RX ORDER — ROSUVASTATIN CALCIUM 20 MG/1
TABLET, COATED ORAL
Qty: 90 TABLET | Refills: 0 | Status: SHIPPED | OUTPATIENT
Start: 2020-06-16 | End: 2020-10-16

## 2020-06-16 RX ORDER — RAMIPRIL 5 MG/1
CAPSULE ORAL
Qty: 90 CAPSULE | Refills: 0 | Status: SHIPPED | OUTPATIENT
Start: 2020-06-16 | End: 2020-10-16

## 2020-06-16 RX ORDER — METOPROLOL SUCCINATE 50 MG/1
TABLET, EXTENDED RELEASE ORAL
Qty: 180 TABLET | Refills: 0 | Status: SHIPPED | OUTPATIENT
Start: 2020-06-16 | End: 2020-10-16

## 2020-10-16 DIAGNOSIS — I10 BENIGN ESSENTIAL HYPERTENSION: ICD-10-CM

## 2020-10-16 RX ORDER — RAMIPRIL 5 MG/1
CAPSULE ORAL
Qty: 90 CAPSULE | Refills: 0 | Status: SHIPPED | OUTPATIENT
Start: 2020-10-16 | End: 2021-02-17

## 2020-10-16 RX ORDER — METOPROLOL SUCCINATE 50 MG/1
TABLET, EXTENDED RELEASE ORAL
Qty: 180 TABLET | Refills: 0 | Status: SHIPPED | OUTPATIENT
Start: 2020-10-16 | End: 2021-02-17

## 2020-10-16 RX ORDER — ROSUVASTATIN CALCIUM 20 MG/1
TABLET, COATED ORAL
Qty: 90 TABLET | Refills: 0 | Status: SHIPPED | OUTPATIENT
Start: 2020-10-16 | End: 2021-02-17

## 2021-02-16 DIAGNOSIS — I10 BENIGN ESSENTIAL HYPERTENSION: ICD-10-CM

## 2021-02-17 RX ORDER — METOPROLOL SUCCINATE 50 MG/1
TABLET, EXTENDED RELEASE ORAL
Qty: 180 TABLET | Refills: 0 | Status: SHIPPED | OUTPATIENT
Start: 2021-02-17 | End: 2021-06-23

## 2021-02-17 RX ORDER — RAMIPRIL 5 MG/1
CAPSULE ORAL
Qty: 90 CAPSULE | Refills: 0 | Status: SHIPPED | OUTPATIENT
Start: 2021-02-17 | End: 2021-06-23

## 2021-02-17 RX ORDER — ROSUVASTATIN CALCIUM 20 MG/1
TABLET, COATED ORAL
Qty: 90 TABLET | Refills: 0 | Status: SHIPPED | OUTPATIENT
Start: 2021-02-17 | End: 2021-06-23

## 2021-06-07 ENCOUNTER — TELEPHONE (OUTPATIENT)
Dept: FAMILY MEDICINE CLINIC | Facility: CLINIC | Age: 66
End: 2021-06-07

## 2021-06-07 NOTE — TELEPHONE ENCOUNTER
PATIENT CALLED TO MAKE LAB APPT. I SPOKE TO TITO AND SHE SAYS APPT NO LONGER REQUIRED. ADVISED PATIENT CAN WALK-IN  THERE IS NO LAB ORDERS FOR THIS PATIENT.  ONCE LAB ORDERS ARE IN, PLEASE CALL BACK SO PATIENT CAN COME TO DO HIS LABS    [phone]

## 2021-06-07 NOTE — TELEPHONE ENCOUNTER
Left message to return call, pt has not been seen in more than 1 year and will need an office visit

## 2021-06-23 DIAGNOSIS — I10 BENIGN ESSENTIAL HYPERTENSION: ICD-10-CM

## 2021-06-23 RX ORDER — ROSUVASTATIN CALCIUM 20 MG/1
TABLET, COATED ORAL
Qty: 90 TABLET | Refills: 0 | Status: SHIPPED | OUTPATIENT
Start: 2021-06-23 | End: 2021-10-19

## 2021-06-23 RX ORDER — RAMIPRIL 5 MG/1
CAPSULE ORAL
Qty: 90 CAPSULE | Refills: 0 | Status: SHIPPED | OUTPATIENT
Start: 2021-06-23 | End: 2022-06-01

## 2021-06-23 RX ORDER — METOPROLOL SUCCINATE 50 MG/1
TABLET, EXTENDED RELEASE ORAL
Qty: 180 TABLET | Refills: 0 | Status: SHIPPED | OUTPATIENT
Start: 2021-06-23 | End: 2021-10-19

## 2021-07-08 ENCOUNTER — OFFICE VISIT (OUTPATIENT)
Dept: FAMILY MEDICINE CLINIC | Facility: CLINIC | Age: 66
End: 2021-07-08

## 2021-07-08 VITALS
HEIGHT: 72 IN | SYSTOLIC BLOOD PRESSURE: 126 MMHG | BODY MASS INDEX: 37.25 KG/M2 | WEIGHT: 275 LBS | OXYGEN SATURATION: 98 % | TEMPERATURE: 97.3 F | HEART RATE: 80 BPM | DIASTOLIC BLOOD PRESSURE: 74 MMHG

## 2021-07-08 DIAGNOSIS — I25.10 CHRONIC CORONARY ARTERY DISEASE: Primary | ICD-10-CM

## 2021-07-08 DIAGNOSIS — Z00.00 ENCOUNTER FOR SUBSEQUENT ANNUAL WELLNESS VISIT (AWV) IN MEDICARE PATIENT: ICD-10-CM

## 2021-07-08 DIAGNOSIS — E11.59 TYPE 2 DIABETES MELLITUS WITH OTHER CIRCULATORY COMPLICATION, WITHOUT LONG-TERM CURRENT USE OF INSULIN (HCC): ICD-10-CM

## 2021-07-08 DIAGNOSIS — E11.9 ENCOUNTER FOR COMPREHENSIVE DIABETIC FOOT EXAMINATION, TYPE 2 DIABETES MELLITUS (HCC): ICD-10-CM

## 2021-07-08 DIAGNOSIS — E53.8 B12 DEFICIENCY: ICD-10-CM

## 2021-07-08 PROBLEM — R00.2 PALPITATIONS: Status: ACTIVE | Noted: 2020-01-08

## 2021-07-08 PROBLEM — R94.2 ABNORMAL PET SCAN, LUNG: Status: ACTIVE | Noted: 2020-11-01

## 2021-07-08 PROBLEM — C34.92 ADENOCARCINOMA, LUNG, LEFT (HCC): Status: ACTIVE | Noted: 2020-11-01

## 2021-07-08 PROBLEM — E66.01 MORBID OBESITY WITH BMI OF 40.0-44.9, ADULT: Status: ACTIVE | Noted: 2020-02-19

## 2021-07-08 PROCEDURE — G0439 PPPS, SUBSEQ VISIT: HCPCS | Performed by: NURSE PRACTITIONER

## 2021-07-08 PROCEDURE — 99214 OFFICE O/P EST MOD 30 MIN: CPT | Performed by: NURSE PRACTITIONER

## 2021-07-08 NOTE — PROGRESS NOTES
"Subjective   Gordon Gutierrez is a 66 y.o. male who presents for a follow up on diabetes.    History of Present Illness   From review of labs at cancer center, blood sugar control expected well controlled.   Lung cancer diagnosed incidentally 10/2021. Had chemo and radiation. Never hospitalized. Now with immunotherapy. Has secondary diagnosis of bladder cancer and is following with urology  Blood counts never fully recovered since chemo  The following portions of the patient's history were reviewed and updated as appropriate: allergies, current medications, past family history, past medical history, past social history, past surgical history and problem list.    Review of Systems   Constitutional: Negative.  Negative for activity change and unexpected weight change.   HENT: Negative.    Eyes: Negative.  Negative for visual disturbance.   Respiratory: Positive for shortness of breath. Negative for wheezing.    Cardiovascular: Negative.  Negative for chest pain.   Gastrointestinal: Negative.  Negative for constipation and diarrhea.   Endocrine: Negative.    Genitourinary: Negative for difficulty urinating and frequency.   Musculoskeletal: Negative.    Neurological: Negative for weakness and headaches.   Hematological: Negative.    Psychiatric/Behavioral: Negative.  Negative for dysphoric mood.     /74   Pulse 80   Temp 97.3 °F (36.3 °C) (Infrared)   Ht 182.9 cm (72\")   Wt 125 kg (275 lb)   SpO2 98%   BMI 37.30 kg/m²     Objective   Physical Exam  Vitals and nursing note reviewed.   Constitutional:       Appearance: He is well-developed. He is obese. He is not diaphoretic.   HENT:      Head: Normocephalic and atraumatic.   Neck:      Thyroid: No thyromegaly.   Cardiovascular:      Rate and Rhythm: Normal rate and regular rhythm.      Pulses:           Carotid pulses are 2+ on the right side and 2+ on the left side.     Heart sounds: Normal heart sounds.   Pulmonary:      Effort: Pulmonary effort is normal.      " Breath sounds: Normal breath sounds.   Musculoskeletal:      Cervical back: Normal range of motion and neck supple.   Lymphadenopathy:      Cervical: No cervical adenopathy.   Neurological:      Mental Status: He is alert.   Psychiatric:         Mood and Affect: Mood normal.         Behavior: Behavior normal.         Thought Content: Thought content normal.         Judgment: Judgment normal.       Assessment/Plan   Problems Addressed this Visit        Cardiac and Vasculature    Chronic coronary artery disease - Primary    Relevant Orders    Lipid Panel      Other Visit Diagnoses     Type 2 diabetes mellitus with other circulatory complication, without long-term current use of insulin (CMS/[facility])        Relevant Orders    Hemoglobin A1c    Microalbumin / Creatinine Urine Ratio - Urine, Clean Catch    B12 deficiency        Relevant Orders    Vitamin B12    Encounter for comprehensive diabetic foot examination, type 2 diabetes mellitus (CMS/[facility])          Diagnoses       Codes Comments    Chronic coronary artery disease    -  Primary ICD-10-CM: I25.10  ICD-9-CM: 414.00     Type 2 diabetes mellitus with other circulatory complication, without long-term current use of insulin (CMS/[facility])     ICD-10-CM: E11.59  ICD-9-CM: 250.70     B12 deficiency     ICD-10-CM: E53.8  ICD-9-CM: 266.2     Encounter for comprehensive diabetic foot examination, type 2 diabetes mellitus (CMS/[facility])     ICD-10-CM: E11.9  ICD-9-CM: 250.00         Chronic coronary artery disease--tight lipid control. Has preop clearance with them this week  P5YW--mdwiph good control. Update monitoring  B12 deficiency--consider replacement  Diabetic foot care discussed

## 2021-07-08 NOTE — PROGRESS NOTES
The ABCs of the Annual Wellness Visit  Subsequent Medicare Wellness Visit    Chief Complaint   Patient presents with   • Annual Exam       Subjective   History of Present Illness:  Gordon Gutierrez is a 66 y.o. male who presents for a Subsequent Medicare Wellness Visit.    HEALTH RISK ASSESSMENT    Recent Hospitalizations:  No hospitalization(s) within the last year.    Current Medical Providers:  Patient Care Team:  Arden Olivares DO as PCP - General (Family Medicine)    Smoking Status:  Social History     Tobacco Use   Smoking Status Former Smoker   • Quit date: 1996   • Years since quittin.4   Smokeless Tobacco Never Used       Alcohol Consumption:  Social History     Substance and Sexual Activity   Alcohol Use Yes       Depression Screen:   PHQ-2/PHQ-9 Depression Screening 2021   Little interest or pleasure in doing things 0   Feeling down, depressed, or hopeless 0   Trouble falling or staying asleep, or sleeping too much 0   Feeling tired or having little energy 0   Poor appetite or overeating 0   Feeling bad about yourself - or that you are a failure or have let yourself or your family down 0   Trouble concentrating on things, such as reading the newspaper or watching television 0   Moving or speaking so slowly that other people could have noticed. Or the opposite - being so fidgety or restless that you have been moving around a lot more than usual 0   Thoughts that you would be better off dead, or of hurting yourself in some way 0   Total Score 0   If you checked off any problems, how difficult have these problems made it for you to do your work, take care of things at home, or get along with other people? Not difficult at all       Fall Risk Screen:  STEADI Fall Risk Assessment was completed, and patient is at LOW risk for falls.Assessment completed on:2021    Health Habits and Functional and Cognitive Screening:  Functional & Cognitive Status 2021   Do you have difficulty preparing food  and eating? No   Do you have difficulty bathing yourself, getting dressed or grooming yourself? No   Do you have difficulty using the toilet? No   Do you have difficulty moving around from place to place? No   Do you have trouble with steps or getting out of a bed or a chair? Yes   Current Diet Well Balanced Diet   Dental Exam Not up to date   Eye Exam Up to date   Exercise (times per week) 3 times per week   Current Exercises Include Walking   Current Exercise Activities Include -   Do you need help using the phone?  No   Are you deaf or do you have serious difficulty hearing?  No   Do you need help with transportation? No   Do you need help shopping? No   Do you need help preparing meals?  No   Do you need help with housework?  No   Do you need help with laundry? No   Do you need help taking your medications? No   Do you need help managing money? No   Do you ever drive or ride in a car without wearing a seat belt? No   Have you felt unusual stress, anger or loneliness in the last month? No   Who do you live with? Spouse   If you need help, do you have trouble finding someone available to you? No   Have you been bothered in the last four weeks by sexual problems? No   Do you have difficulty concentrating, remembering or making decisions? No         Does the patient have evidence of cognitive impairment? No    Asprin use counseling:Taking ASA appropriately as indicated    Age-appropriate Screening Schedule:  Refer to the list below for future screening recommendations based on patient's age, sex and/or medical conditions. Orders for these recommended tests are listed in the plan section. The patient has been provided with a written plan.    Health Maintenance   Topic Date Due   • URINE MICROALBUMIN  11/08/2017   • DIABETIC EYE EXAM  09/01/2020   • HEMOGLOBIN A1C  09/16/2020   • LIPID PANEL  03/16/2021   • INFLUENZA VACCINE  08/01/2021   • DIABETIC FOOT EXAM  07/08/2022   • TDAP/TD VACCINES (3 - Td or Tdap)  06/12/2027          The following portions of the patient's history were reviewed and updated as appropriate: allergies, current medications, past family history, past medical history, past social history, past surgical history and problem list.    Outpatient Medications Prior to Visit   Medication Sig Dispense Refill   • aspirin 81 MG tablet Take by mouth.     • magnesium oxide (MAGOX) 400 (241.3 Mg) MG tablet tablet Take 400 mg by mouth Daily.     • metFORMIN (Glucophage) 500 MG tablet Take 1 tablet by mouth 2 (Two) Times a Day With Meals. 60 tablet 1   • metoprolol succinate XL (TOPROL-XL) 50 MG 24 hr tablet TAKE TWO TABLETS BY MOUTH DAILY 180 tablet 0   • Misc Natural Products (OSTEO BI-FLEX JOINT SHIELD PO) Take  by mouth.     • nitroglycerin (NITROSTAT) 0.4 MG SL tablet Place 0.4 mg under the tongue Every 5 (Five) Minutes.     • ramipril (ALTACE) 5 MG capsule TAKE ONE CAPSULE BY MOUTH DAILY 90 capsule 0   • rosuvastatin (CRESTOR) 20 MG tablet TAKE ONE TABLET BY MOUTH DAILY 90 tablet 0     No facility-administered medications prior to visit.       Patient Active Problem List   Diagnosis   • Abdominal pain   • Angina pectoris (CMS/HCC)   • Aortic valve insufficiency   • Chronic coronary artery disease   • Benign essential hypertension   • Benign prostatic hyperplasia   • Cardiomyopathy (CMS/HCC)   • Dyslipidemia   • Erectile dysfunction of nonorganic origin   • Steatosis of liver   • Flank pain   • Generalized osteoarthritis   • Hematuria   • Impaired fasting glucose   • Low back pain   • Morbid obesity (CMS/HCC)   • Paroxysmal supraventricular tachycardia (CMS/HCC)   • Tricuspid valve insufficiency   • Vitamin D deficiency   • ISIS (obstructive sleep apnea)   • Dyspnea on exertion   • Hyperlipidemia   • Abnormal PET scan, lung   • Adenocarcinoma, lung, left (CMS/HCC)   • Morbid obesity with BMI of 40.0-44.9, adult (CMS/HCC)   • Palpitations       Advanced Care Planning:  ACP discussion was held with the patient  "during this visit. Patient does not have an advance directive, information provided.    Review of Systems    Compared to one year ago, the patient feels his physical health is worse.  Compared to one year ago, the patient feels his mental health is the same.    Reviewed chart for potential of high risk medication in the elderly: yes  Reviewed chart for potential of harmful drug interactions in the elderly:yes    Objective         Vitals:    07/08/21 0940   BP: 126/74   Pulse: 80   Temp: 97.3 °F (36.3 °C)   TempSrc: Infrared   SpO2: 98%   Weight: 125 kg (275 lb)   Height: 182.9 cm (72\")   PainSc: 0-No pain       Body mass index is 37.3 kg/m².  Discussed the patient's BMI with him. The BMI is above average; no BMI management plan is appropriate..    Physical Exam          Assessment/Plan   Medicare Risks and Personalized Health Plan  CMS Preventative Services Quick Reference  Advance Directive Discussion  Cardiovascular risk  Immunizations Discussed/Encouraged (specific immunizations; COVID19 )    The above risks/problems have been discussed with the patient.  Pertinent information has been shared with the patient in the After Visit Summary.  Follow up plans and orders are seen below in the Assessment/Plan Section.    Diagnoses and all orders for this visit:    1. Chronic coronary artery disease (Primary)  -     Lipid Panel    2. Type 2 diabetes mellitus with other circulatory complication, without long-term current use of insulin (CMS/[facility])  -     Hemoglobin A1c  -     Microalbumin / Creatinine Urine Ratio - Urine, Clean Catch    3. B12 deficiency  -     Vitamin B12    4. Encounter for comprehensive diabetic foot examination, type 2 diabetes mellitus (CMS/[facility])    5. Encounter for subsequent annual wellness visit (AWV) in Medicare patient      Follow Up:  No follow-ups on file.     An After Visit Summary and PPPS were given to the patient.               "

## 2021-07-09 LAB
CHOLEST SERPL-MCNC: 149 MG/DL (ref 100–199)
CREAT UR-MCNC: NORMAL MG/DL
HBA1C MFR BLD: 5.8 % (ref 4.8–5.6)
HDLC SERPL-MCNC: 33 MG/DL
LDLC SERPL CALC-MCNC: 68 MG/DL (ref 0–99)
MICROALBUMIN UR-MCNC: NORMAL
SPECIMEN STATUS: NORMAL
TRIGL SERPL-MCNC: 298 MG/DL (ref 0–149)
VIT B12 SERPL-MCNC: 451 PG/ML (ref 232–1245)
VLDLC SERPL CALC-MCNC: 48 MG/DL (ref 5–40)

## 2021-07-11 NOTE — PROGRESS NOTES
Please call the patient regarding his abnormal result. Blood sugar greatly improved with weight loss. A1c 5.8, consider decreasing metformin to 1/day, continue crestor. FU 6 months

## 2021-07-21 PROCEDURE — 88307 TISSUE EXAM BY PATHOLOGIST: CPT | Performed by: UROLOGY

## 2021-07-22 ENCOUNTER — LAB REQUISITION (OUTPATIENT)
Dept: LAB | Facility: HOSPITAL | Age: 66
End: 2021-07-22

## 2021-07-22 DIAGNOSIS — N32.9 BLADDER DISORDER, UNSPECIFIED: ICD-10-CM

## 2021-07-23 LAB
LAB AP CASE REPORT: NORMAL
LAB AP SYNOPTIC CHECKLIST: NORMAL
PATH REPORT.FINAL DX SPEC: NORMAL
PATH REPORT.GROSS SPEC: NORMAL

## 2021-10-19 DIAGNOSIS — I10 BENIGN ESSENTIAL HYPERTENSION: ICD-10-CM

## 2021-10-19 RX ORDER — RAMIPRIL 5 MG/1
CAPSULE ORAL
Qty: 90 CAPSULE | Refills: 0 | OUTPATIENT
Start: 2021-10-19

## 2021-10-19 RX ORDER — METOPROLOL SUCCINATE 50 MG/1
TABLET, EXTENDED RELEASE ORAL
Qty: 180 TABLET | Refills: 0 | Status: SHIPPED | OUTPATIENT
Start: 2021-10-19 | End: 2022-01-25

## 2021-10-19 RX ORDER — ROSUVASTATIN CALCIUM 20 MG/1
TABLET, COATED ORAL
Qty: 90 TABLET | Refills: 0 | Status: SHIPPED | OUTPATIENT
Start: 2021-10-19 | End: 2022-01-25

## 2021-10-20 NOTE — TELEPHONE ENCOUNTER
Patient reports he is already seeing a nephrologist because of bladder cancer. He is also in immuno therapy and has labs before each treatment. He will stop ramipril and monitor BP. Will let nephrology know at next appt.

## 2022-01-25 DIAGNOSIS — I10 BENIGN ESSENTIAL HYPERTENSION: ICD-10-CM

## 2022-01-25 RX ORDER — METOPROLOL SUCCINATE 50 MG/1
TABLET, EXTENDED RELEASE ORAL
Qty: 180 TABLET | Refills: 0 | Status: SHIPPED | OUTPATIENT
Start: 2022-01-25 | End: 2022-07-22

## 2022-01-25 RX ORDER — ROSUVASTATIN CALCIUM 20 MG/1
TABLET, COATED ORAL
Qty: 90 TABLET | Refills: 0 | Status: SHIPPED | OUTPATIENT
Start: 2022-01-25 | End: 2022-07-22

## 2022-06-01 ENCOUNTER — OFFICE VISIT (OUTPATIENT)
Dept: FAMILY MEDICINE CLINIC | Facility: CLINIC | Age: 67
End: 2022-06-01

## 2022-06-01 VITALS
RESPIRATION RATE: 20 BRPM | DIASTOLIC BLOOD PRESSURE: 70 MMHG | BODY MASS INDEX: 37.79 KG/M2 | SYSTOLIC BLOOD PRESSURE: 142 MMHG | OXYGEN SATURATION: 99 % | HEIGHT: 72 IN | HEART RATE: 117 BPM | TEMPERATURE: 97.7 F | WEIGHT: 279 LBS

## 2022-06-01 DIAGNOSIS — N18.32 STAGE 3B CHRONIC KIDNEY DISEASE: ICD-10-CM

## 2022-06-01 DIAGNOSIS — C34.92 ADENOCARCINOMA, LUNG, LEFT: ICD-10-CM

## 2022-06-01 DIAGNOSIS — E11.59 TYPE 2 DIABETES MELLITUS WITH OTHER CIRCULATORY COMPLICATION, WITHOUT LONG-TERM CURRENT USE OF INSULIN: ICD-10-CM

## 2022-06-01 DIAGNOSIS — I25.10 CHRONIC CORONARY ARTERY DISEASE: ICD-10-CM

## 2022-06-01 DIAGNOSIS — R00.0 TACHYCARDIA: Primary | ICD-10-CM

## 2022-06-01 PROCEDURE — 93000 ELECTROCARDIOGRAM COMPLETE: CPT | Performed by: NURSE PRACTITIONER

## 2022-06-01 PROCEDURE — 99214 OFFICE O/P EST MOD 30 MIN: CPT | Performed by: NURSE PRACTITIONER

## 2022-06-01 NOTE — PROGRESS NOTES
"Chief Complaint  Cough (X2 weeks neg covid 1-2 weeks ago), Hypertension, and Coronary Artery Disease    Subjective        Gordon Gutierrez presents to [facility]  History of Present Illness  Cough x 2 weeks, nearly hard enough to pass out.  SOA--finished chemotherapy and immune therapy. FU lung CT later this month.  Blood sugar 150-200, if pasta, next fasting will be >200  Has not coughed hardly at all today.  Was up to 295# before started coughing  Off ramipril secondary to decreased kidney function and no heart racing until recent cough. Was more congested yesterday  Objective   Vital Signs:  /70 (BP Location: Left arm, Patient Position: Sitting, Cuff Size: Adult)   Pulse 117   Temp 97.7 °F (36.5 °C) (Temporal)   Resp 20   Ht 182.9 cm (72\")   Wt 127 kg (279 lb)   SpO2 99%   BMI 37.84 kg/m²     Class 2 Severe Obesity (BMI >=35 and <=39.9). Obesity-related health conditions include the following: obstructive sleep apnea, coronary heart disease and diabetes mellitus. Obesity is worsening. BMI is is above average; BMI management plan is completed. We discussed portion control, increasing exercise and diabetic diet.      Physical Exam  Constitutional:       Appearance: He is well-developed. He is obese. He is not ill-appearing or diaphoretic.   HENT:      Head: Normocephalic and atraumatic.   Neck:      Thyroid: No thyromegaly.   Cardiovascular:      Rate and Rhythm: Regular rhythm. Tachycardia present.      Pulses:           Carotid pulses are 2+ on the right side and 2+ on the left side.     Heart sounds: Murmur (trace) heard.   Pulmonary:      Effort: Pulmonary effort is normal.      Breath sounds: Examination of the left-middle field reveals rhonchi. Examination of the right-lower field reveals rhonchi. Examination of the left-lower field reveals rhonchi. Rhonchi present.   Musculoskeletal:      Cervical back: Normal range of motion and neck supple.   Lymphadenopathy:      " Cervical: No cervical adenopathy.   Psychiatric:         Behavior: Behavior normal.         Thought Content: Thought content normal.         Judgment: Judgment normal.        Result Review :           ECG 12 Lead    Date/Time: 6/1/2022 1:38 PM  Performed by: Taina Gonsalves APRN  Authorized by: Taina Gonsalves APRN   Comparison: not compared with previous ECG   Previous ECG: no previous ECG available  Rhythm: sinus rhythm  Rate: normal  Conduction: conduction normal  ST Segments: ST segments normal  T inversion: aVR and V1  QRS axis: normal    Clinical impression: normal ECG              Assessment and Plan   Diagnoses and all orders for this visit:    1. Tachycardia (Primary)  -     Magnesium    2. Type 2 diabetes mellitus with other circulatory complication, without long-term current use of insulin (HCC)  -     Vitamin B12  -     Lipid Panel  -     Hemoglobin A1c  -     Basic Metabolic Panel  -     Magnesium    3. Chronic coronary artery disease  -     Lipid Panel    4. Stage 3b chronic kidney disease (HCC)  -     Basic Metabolic Panel    5. Adenocarcinoma, lung, left (HCC)    Other orders  -     ECG 12 Lead    Tachycardia- documented on arrival today and having symptoms.  Less likely to be associated with coming off ramipril as that was done last fall.  The sensation of heart racing and feeling like he is almost going to pass out is new.  My suspicion is that his diabetes has become uncontrolled and he is experiencing tachycardia associated with dehydration.  EKG normal today once resting.  Type 2 diabetes- discussed recommended routine monitoring at least every 6 months. his last A1c was in July last year.  A1c at the time was 5.8, but the values that he is seeing when he is using his meter occasionally do not reflect this.  Update diabetes panel.  Coronary artery disease--check lipid panel with LDL goal less than 70  CKD 3b--avoid nephrotoxins NSAIDs stay hydrated.  Reviewed his chemistry from last  oncology appointment and his blood sugar was 259 with creatinine rising slightly 1.85  Adenocarcinoma of the lung and shortness of air- has imaging set for later this month.  Follow-up if shortness of air not improving with treating other causes.         Follow Up   No follow-ups on file.  Patient was given instructions and counseling regarding his condition or for health maintenance advice. Please see specific information pulled into the AVS if appropriate.     Echo 9/2021 with normal LV function

## 2022-06-02 LAB
BUN SERPL-MCNC: 36 MG/DL (ref 8–27)
BUN/CREAT SERPL: 20 (ref 10–24)
CALCIUM SERPL-MCNC: 9.8 MG/DL (ref 8.6–10.2)
CHLORIDE SERPL-SCNC: 104 MMOL/L (ref 96–106)
CHOLEST SERPL-MCNC: 115 MG/DL (ref 100–199)
CO2 SERPL-SCNC: 22 MMOL/L (ref 20–29)
CREAT SERPL-MCNC: 1.8 MG/DL (ref 0.76–1.27)
EGFRCR SERPLBLD CKD-EPI 2021: 40 ML/MIN/1.73
GLUCOSE SERPL-MCNC: 101 MG/DL (ref 65–99)
HBA1C MFR BLD: 7 % (ref 4.8–5.6)
HDLC SERPL-MCNC: 28 MG/DL
LDLC SERPL CALC-MCNC: 44 MG/DL (ref 0–99)
MAGNESIUM SERPL-MCNC: 2.1 MG/DL (ref 1.6–2.3)
POTASSIUM SERPL-SCNC: 5.4 MMOL/L (ref 3.5–5.2)
SODIUM SERPL-SCNC: 143 MMOL/L (ref 134–144)
TRIGL SERPL-MCNC: 282 MG/DL (ref 0–149)
VIT B12 SERPL-MCNC: 603 PG/ML (ref 232–1245)
VLDLC SERPL CALC-MCNC: 43 MG/DL (ref 5–40)

## 2022-06-06 ENCOUNTER — TELEPHONE (OUTPATIENT)
Dept: FAMILY MEDICINE CLINIC | Facility: CLINIC | Age: 67
End: 2022-06-06

## 2022-06-06 NOTE — TELEPHONE ENCOUNTER
I have not seen this patient in 6 years. Looks like seepedro Stern. Please check with her and on any labs.  RRJ

## 2022-06-06 NOTE — TELEPHONE ENCOUNTER
Caller: Gordon Gutierrez    Relationship: Self    Best call back number: [phone]    What test was performed: LABS    When was the test performed: 6/1/22    Where was the test performed: IN OFFICE    Additional notes: PATIENT REQUESTS A CALL BACK WITH RESULTS OF LABS ONCE ORDERS ARE IN.

## 2022-06-10 DIAGNOSIS — E87.5 HYPERKALEMIA: ICD-10-CM

## 2022-06-10 DIAGNOSIS — E11.59 TYPE 2 DIABETES MELLITUS WITH OTHER CIRCULATORY COMPLICATION, WITHOUT LONG-TERM CURRENT USE OF INSULIN: Primary | ICD-10-CM

## 2022-06-10 RX ORDER — GLIPIZIDE 5 MG/1
2.5 TABLET ORAL
Qty: 45 TABLET | Refills: 1 | Status: SHIPPED | OUTPATIENT
Start: 2022-06-10

## 2022-06-10 NOTE — PROGRESS NOTES
Potassium is elevated, agree with stopping ramipril.  Cholesterol to goal except triglycerides.  Work on diet.  A1c is back up to 7, added glipizide 2.5 mg with biggest meal of the day.    Recheck potassium in 1 month

## 2022-07-22 DIAGNOSIS — I10 BENIGN ESSENTIAL HYPERTENSION: ICD-10-CM

## 2022-07-22 RX ORDER — METOPROLOL SUCCINATE 50 MG/1
TABLET, EXTENDED RELEASE ORAL
Qty: 180 TABLET | Refills: 0 | Status: SHIPPED | OUTPATIENT
Start: 2022-07-22 | End: 2022-11-09 | Stop reason: SDUPTHER

## 2022-07-22 RX ORDER — ROSUVASTATIN CALCIUM 20 MG/1
TABLET, COATED ORAL
Qty: 90 TABLET | Refills: 0 | Status: SHIPPED | OUTPATIENT
Start: 2022-07-22 | End: 2022-11-09 | Stop reason: SDUPTHER

## 2022-11-09 DIAGNOSIS — E78.5 DYSLIPIDEMIA: Primary | ICD-10-CM

## 2022-11-09 DIAGNOSIS — I10 BENIGN ESSENTIAL HYPERTENSION: ICD-10-CM

## 2022-11-09 RX ORDER — ROSUVASTATIN CALCIUM 20 MG/1
20 TABLET, COATED ORAL DAILY
Qty: 90 TABLET | Refills: 0 | Status: SHIPPED | OUTPATIENT
Start: 2022-11-09 | End: 2023-03-20

## 2022-11-09 RX ORDER — METOPROLOL SUCCINATE 50 MG/1
100 TABLET, EXTENDED RELEASE ORAL DAILY
Qty: 180 TABLET | Refills: 0 | Status: SHIPPED | OUTPATIENT
Start: 2022-11-09

## 2023-03-20 DIAGNOSIS — E78.5 DYSLIPIDEMIA: ICD-10-CM

## 2023-03-20 RX ORDER — ROSUVASTATIN CALCIUM 20 MG/1
TABLET, COATED ORAL
Qty: 90 TABLET | Refills: 0 | Status: SHIPPED | OUTPATIENT
Start: 2023-03-20

## 2023-05-04 ENCOUNTER — OFFICE VISIT (OUTPATIENT)
Dept: FAMILY MEDICINE CLINIC | Facility: CLINIC | Age: 68
End: 2023-05-04
Payer: MEDICARE

## 2023-05-04 VITALS
OXYGEN SATURATION: 97 % | DIASTOLIC BLOOD PRESSURE: 82 MMHG | RESPIRATION RATE: 18 BRPM | HEART RATE: 92 BPM | WEIGHT: 285 LBS | HEIGHT: 72 IN | BODY MASS INDEX: 38.6 KG/M2 | SYSTOLIC BLOOD PRESSURE: 154 MMHG

## 2023-05-04 DIAGNOSIS — E78.5 DYSLIPIDEMIA: ICD-10-CM

## 2023-05-04 DIAGNOSIS — E55.9 VITAMIN D DEFICIENCY: ICD-10-CM

## 2023-05-04 DIAGNOSIS — H61.23 BILATERAL IMPACTED CERUMEN: ICD-10-CM

## 2023-05-04 DIAGNOSIS — E11.59 TYPE 2 DIABETES MELLITUS WITH OTHER CIRCULATORY COMPLICATION, WITHOUT LONG-TERM CURRENT USE OF INSULIN: Primary | ICD-10-CM

## 2023-05-04 DIAGNOSIS — C34.92 ADENOCARCINOMA, LUNG, LEFT: ICD-10-CM

## 2023-05-04 DIAGNOSIS — E11.9 ENCOUNTER FOR DIABETIC FOOT EXAM: ICD-10-CM

## 2023-05-04 DIAGNOSIS — I47.1 PAROXYSMAL SUPRAVENTRICULAR TACHYCARDIA: ICD-10-CM

## 2023-05-04 DIAGNOSIS — I25.10 CHRONIC CORONARY ARTERY DISEASE: ICD-10-CM

## 2023-05-04 RX ORDER — GLIPIZIDE 5 MG/1
2.5 TABLET ORAL
Qty: 45 TABLET | Refills: 3 | Status: SHIPPED | OUTPATIENT
Start: 2023-05-04

## 2023-05-04 RX ORDER — METOPROLOL SUCCINATE 50 MG/1
150 TABLET, EXTENDED RELEASE ORAL DAILY
COMMUNITY
Start: 2023-04-27

## 2023-05-04 NOTE — PROGRESS NOTES
Subjective   Gordon Gutierrez is a 68 y.o. male. Presents today for   Chief Complaint   Patient presents with   • Med Refill   • Ear Fullness   • Hypertension   • Hyperlipidemia   • Coronary Artery Disease   • Diabetes       History of Present Illness  Patient 69 y/o with dm2, htn, hld, cad, aftrial fib and Stage IIIc, poorly differentiated pulmonary adenocarcinoma, PDL 1 status 70%, mutational status negative, which presented as a PET avid cavitary mass in the posterior aspect of the left lower lobe measuring 2.6 x 4.4 cm along with enlarged and PET avid right supraclavicular, left hilar, paratracheal, and subcarinal lymph nodes, evaluated in November 2020.   Is following closely with oncology, just had MRI yesterday for ? Nasopharyngeal lesion on PET, but was clear;    No cp/soa; no abd pain;  Reports feels good;   Seeing cardiology for cad, a. Fib;   Ears feel full; no uri symptoms.    Review of Systems   Respiratory: Negative for shortness of breath.    Cardiovascular: Negative for chest pain, palpitations and leg swelling.   Gastrointestinal: Negative for abdominal pain, nausea and vomiting.       Patient Active Problem List   Diagnosis   • Abdominal pain   • Angina pectoris   • Aortic valve insufficiency   • Chronic coronary artery disease   • Benign essential hypertension   • Benign prostatic hyperplasia   • Cardiomyopathy   • Dyslipidemia   • Erectile dysfunction of nonorganic origin   • Steatosis of liver   • Flank pain   • Generalized osteoarthritis   • Hematuria   • Impaired fasting glucose   • Low back pain   • Morbid obesity   • Paroxysmal supraventricular tachycardia   • Tricuspid valve insufficiency   • Vitamin D deficiency   • ISIS (obstructive sleep apnea)   • Dyspnea on exertion   • Hyperlipidemia   • Abnormal PET scan, lung   • Adenocarcinoma, lung, left   • Morbid obesity with BMI of 40.0-44.9, adult   • Palpitations       Social History     Socioeconomic History   • Marital status:    Tobacco Use  "  • Smoking status: Former     Packs/day: 3.00     Years: 50.00     Pack years: 150.00     Types: Cigarettes     Quit date: 1996     Years since quittin.2   • Smokeless tobacco: Never   Vaping Use   • Vaping Use: Never used   Substance and Sexual Activity   • Alcohol use: Yes   • Drug use: Never   • Sexual activity: Yes     Partners: Female       No Known Allergies    Current Outpatient Medications on File Prior to Visit   Medication Sig Dispense Refill   • apixaban (ELIQUIS) 5 MG tablet tablet Take 1 tablet by mouth.     • aspirin 81 MG tablet Take by mouth.     • metoprolol succinate XL (TOPROL-XL) 50 MG 24 hr tablet Take 2 tablets by mouth Daily. 180 tablet 0   • metoprolol succinate XL (TOPROL-XL) 50 MG 24 hr tablet Take 3 tablets by mouth Daily.     • nitroglycerin (NITROSTAT) 0.4 MG SL tablet Place 1 tablet under the tongue Every 5 (Five) Minutes.     • rosuvastatin (CRESTOR) 20 MG tablet TAKE ONE TABLET BY MOUTH DAILY 90 tablet 0   • [DISCONTINUED] glipizide (Glucotrol) 5 MG tablet Take 0.5 tablets by mouth Daily Before Supper. 45 tablet 1   • [DISCONTINUED] metFORMIN (GLUCOPHAGE) 500 MG tablet Take 1 tablet by mouth 2 (Two) Times a Day With Meals. PLEASE CALL THE OFFICE FOR AN APPT 60 tablet 0     No current facility-administered medications on file prior to visit.       Objective   Vitals:    23 1422   BP: 154/82   BP Location: Left arm   Patient Position: Sitting   Cuff Size: Adult   Pulse: 92   Resp: 18   SpO2: 97%   Weight: 129 kg (285 lb)   Height: 182.9 cm (72.01\")   PainSc: 0-No pain     Body mass index is 38.64 kg/m².    Physical Exam  Vitals and nursing note reviewed.   Constitutional:       Appearance: He is well-developed.   HENT:      Head: Normocephalic and atraumatic.      Right Ear: There is impacted cerumen.      Left Ear: There is impacted cerumen.      Ears:      Comments: MA irrigated and removed;  Recheck improvement in hearing and b/l TM intact, clear.  Neck:      Thyroid: " No thyromegaly.      Vascular: No JVD.   Cardiovascular:      Rate and Rhythm: Normal rate and regular rhythm.      Heart sounds: Normal heart sounds. No murmur heard.    No friction rub. No gallop.   Pulmonary:      Effort: Pulmonary effort is normal. No respiratory distress.      Breath sounds: Normal breath sounds. No wheezing or rales.   Abdominal:      General: Bowel sounds are normal. There is no distension.      Palpations: Abdomen is soft.      Tenderness: There is no abdominal tenderness. There is no guarding or rebound.   Musculoskeletal:      Cervical back: Neck supple.   Feet:      Comments: Diabetic foot exam and monofilament completed, see scanned report.      Skin:     General: Skin is warm and dry.   Neurological:      Mental Status: He is alert.   Psychiatric:         Behavior: Behavior normal.     Cerumen Removal    Date/Time: 5/4/2023 3:28 PM  Performed by: Femi Pfeiffer MA  Authorized by: Arden Olivares DO   Consent: Verbal consent obtained.  Risks and benefits: risks, benefits and alternatives were discussed  Consent given by: patient  Patient understanding: patient states understanding of the procedure being performed  Required items: required blood products, implants, devices, and special equipment available  Patient identity confirmed: verbally with patient    Anesthesia:  Local Anesthetic: none  Ceruminolytics applied: Ceruminolytics applied prior to the procedure.  Location details: right ear and left ear  Patient tolerance: Patient tolerated the procedure well with no immediate complications  Procedure type: irrigation   Sedation:  Patient sedated: no            MRI Brain Wo & W Con  Order: [id number]  Narrative    REVIEWING YOUR TEST RESULTS IN [facility] IS NOT A SUBSTITUTE FOR DISCUSSING THOSE RESULTS WITH YOUR HEALTH CARE PROVIDER.   PLEASE CONTACT YOUR PROVIDER [facility]IA [facility] TO DISCUSS ANY QUESTIONS OR CONCERNS YOU MAY HAVE REGARDING THESE TEST RESULTS.     RADIOLOGY  REPORT     FACILITY:  [facility]   UNIT/AGE/GENDER: F.MRI  OP      AGE:68 Y          SEX:M   PATIENT NAME/:  ERAN MELENDEZ J    1955   UNIT NUMBER:  [id number]   ACCOUNT NUMBER:  [id number]   ACCESSION NUMBER:  [id number]       CLINICAL HISTORY: Non-small cell lung cancer, evaluate for metastasis         COMPARISON: MRI brain 2023     TECHNIQUE: Sagittal T1; axial T1, T2, FLAIR, gradient echo, diffusion. Axial, sagittal, and coronal T1 postcontrast.     FINDINGS:   There is no evidence of intracranial hemorrhage, mass or edema.     Scattered FLAIR hyperintense signal foci within the supratentorial white matter, stable to prior, nonspecific, but likely related to chronic small vessel ischemic change. There is no restricted diffusion to suggest acute ischemia.     No abnormal postcontrast intracranial enhancement.     The ventricles and basilar cisterns are normal in size and configuration for the patients age.     There is no evidence of mass effect or shift of the midline structures.     Posterior fossa structures including the cerebellum and brainstem are unremarkable.     Limited imaging of the orbits demonstrates no orbital abnormality.     There are normal flow voids in the arteries at the level of the [place].     Mild mucosal thickening of the paranasal sinuses. Mastoid air cells are clear.     IMPRESSION:   No acute intracranial findings. No abnormal enhancement to suggest intracranial metastatic disease.     Dictated by: Kati Mensah M.D.     Images and Report reviewed and interpreted by: Kati Mensah M.D.     <PS><Electronically signed by: Kati Mensah M.D.>   2023 1135   PET W/CT Tumor Skull Mid-Thigh  Order: [id number]  Narrative    REVIEWING YOUR TEST RESULTS IN [facility] IS NOT A SUBSTITUTE FOR DISCUSSING THOSE RESULTS WITH YOUR HEALTH CARE PROVIDER.   PLEASE CONTACT YOUR PROVIDER [facility]IA [facility] TO DISCUSS ANY QUESTIONS OR  CONCERNS YOU MAY HAVE REGARDING THESE TEST RESULTS.     RADIOLOGY REPORT     FACILITY:  [facility]   UNIT/AGE/GENDER: FDenisCTCVA  OP      AGE:67 Y          SEX:M   PATIENT NAME/:  ERAN MELENDEZ J    1955   UNIT NUMBER:  [id number]   ACCOUNT NUMBER:  [id number]   ACCESSION NUMBER:  [id number]     PET W/CT TUMOR SKULL MID-THIGH     DATE: 2023     COMPARISON: PET/CT October 10, 2022 and brain MRI 2023     INDICATION: Non-small cell lung cancer. Adenocarcinoma, lung, left. s/p chemo radiation. PS.     TECHNIQUE: Blood Glucose level prior to tracer injection was 102 mg/dL. The patient received 10.17 mCi F-18 FDG via right antecubital injection.   PET imaging was performed from the head to the proximal thighs.  CT imaging was performed for fusion purposes and attenuation correction.  A radiation dose reduction device was utilized for this exam as per ALARA protocol.     FINDINGS:     Head and neck: There is a small focus of activity at the posterior nasopharyngeal wall at the midline with no measurable lesion in this location. This measures approximately 8 mm on PET image #13 and has SUV max of 5.3. No cervical adenopathy.     Chest and lungs: The linear/angular consolidation in the posterior medial aspect of the left lower lobe has a stable CT appearance. The mild relatively homogeneous activity in this area is unchanged with SUV max of 3.4 g/mL, previously 3.4. The 1 cm   nodule in the anterior aspect of the right lower lobe measuring 1 cm which is chronically stable and not Glucose avid.     The foci of activity in both hilar areas appears stable. SUV max in the left mid upper hilum is 2.9, previously 3.6. SUV max in the right lower hilum is 4.2, previously 4.6.     Mean anterior blood pool activity is 2.1.     Abdomen and pelvis: No hypermetabolic lesion in the liver, spleen, or other solid abdominal viscera. Physiologic activity along bowel and urinary tract. The tiny  lymph node in the left upper abdomen between the proximal stomach and belkis of the diaphragm   is stable in size measuring 6 mm short axis on image #93 compared to 7 mm previously and it is not Glucose avid with SUV max of 1.7, previously 2.4. Long segments of activity along bowel and colon with normal CT appearance most compatible with   physiologic uptake.     Mean background liver activity is 3.0.     Skeleton: No suspicious hypermetabolic skeletal lesion.     Other CT findings: Left-sided Dboxya-p-Cnyc catheter remains in place. Atherosclerotic vascular calcifications including coronary artery calcifications. Cholelithiasis. No definite CT evidence of acute cholecystitis. Mild fatty liver infiltration. Tiny   exophytic cyst at the posterior midpole of the left kidney is stable. Mild prostate enlargement. There has been development of trace pleural fluid/thickening on the left.     IMPRESSION:   1. The linear/angular consolidation in the left lower lobe has a stable appearance and most compatible with residual posttreatment change. The mild homogeneous activity throughout this area of consolidation is stable with no increasing activity to   suggest locally recurrent disease.   2. Subcentimeter foci of activity in both shannan with mild decreased activity since a previous exam. These foci of activity could be related to small hilar lymph nodes favored to be benign/reactive in nature.   3. Subcentimeter left upper abdominal lymph node is also stable in size and has further decrease in activity compatible with treatment response.   4. Small focus of activity in the posterior nasopharyngeal wall region without correlate on the CT images. This could represent a small inflammatory lesion with neoplasm thought to be less likely. Recommend attention to this area on follow-up and   consider correlation with direct visualization.   5. Development of trace pleural fluid/thickening on the left.     Dictated by: Rahat Barajas,  M.D.     Assessment & Plan   Diagnoses and all orders for this visit:    1. Type 2 diabetes mellitus with other circulatory complication, without long-term current use of insulin (Primary)  -     metFORMIN (GLUCOPHAGE) 500 MG tablet; Take 1 tablet by mouth 2 (Two) Times a Day With Meals.  Dispense: 180 tablet; Refill: 3  -     glipizide (Glucotrol) 5 MG tablet; Take 0.5 tablets by mouth Daily Before Supper.  Dispense: 45 tablet; Refill: 3  -     Microalbumin / Creatinine Urine Ratio - Urine, Clean Catch  -     Lipid Panel  -     Comprehensive Metabolic Panel  -     Hemoglobin A1c    2. Adenocarcinoma, lung, left    3. Paroxysmal supraventricular tachycardia    4. Chronic coronary artery disease  -     Lipid Panel  -     Comprehensive Metabolic Panel    5. Vitamin D deficiency  -     Vitamin D,25-Hydroxy    6. Dyslipidemia    7. Bilateral impacted cerumen  -     Ear Cerumen Removal    8. Encounter for diabetic foot exam    -out of dm2 meds for 1.5 months;  Check labs  -due check vitamin D  -cad RF reduction, Lipid, BP and BS control.  -hypertension - controlled, continue medications  -HLD - continue statin, recheck lipids.  -cancer - see oncology as doing             Class 2 Severe Obesity (BMI >=35 and <=39.9). Obesity-related health conditions include the following: hypertension, coronary heart disease, diabetes mellitus and dyslipidemias. Obesity is unchanged. BMI is is above average; BMI management plan is completed. We provided information on portion control and increasing exercise.     -Follow up: 6 months and prn

## 2023-05-06 LAB
25(OH)D3+25(OH)D2 SERPL-MCNC: 25.6 NG/ML (ref 30–100)
ALBUMIN SERPL-MCNC: 4.3 G/DL (ref 3.5–5.2)
ALBUMIN/CREAT UR: 1939 MG/G CREAT (ref 0–29)
ALBUMIN/GLOB SERPL: 1.5 G/DL
ALP SERPL-CCNC: 67 U/L (ref 39–117)
ALT SERPL-CCNC: 17 U/L (ref 1–41)
AST SERPL-CCNC: 31 U/L (ref 1–40)
BILIRUB SERPL-MCNC: 0.3 MG/DL (ref 0–1.2)
BUN SERPL-MCNC: 38 MG/DL (ref 8–23)
BUN/CREAT SERPL: 21.2 (ref 7–25)
CALCIUM SERPL-MCNC: 9.8 MG/DL (ref 8.6–10.5)
CHLORIDE SERPL-SCNC: 106 MMOL/L (ref 98–107)
CHOLEST SERPL-MCNC: 166 MG/DL (ref 0–200)
CO2 SERPL-SCNC: 23.5 MMOL/L (ref 22–29)
CREAT SERPL-MCNC: 1.79 MG/DL (ref 0.76–1.27)
CREAT UR-MCNC: 76.8 MG/DL
EGFRCR SERPLBLD CKD-EPI 2021: 40.5 ML/MIN/1.73
GLOBULIN SER CALC-MCNC: 2.8 GM/DL
GLUCOSE SERPL-MCNC: 137 MG/DL (ref 65–99)
HBA1C MFR BLD: 6.8 % (ref 4.8–5.6)
HDLC SERPL-MCNC: 35 MG/DL (ref 40–60)
LDLC SERPL CALC-MCNC: 81 MG/DL (ref 0–100)
MICROALBUMIN UR-MCNC: 1489.5 UG/ML
POTASSIUM SERPL-SCNC: 5.3 MMOL/L (ref 3.5–5.2)
PROT SERPL-MCNC: 7.1 G/DL (ref 6–8.5)
SODIUM SERPL-SCNC: 143 MMOL/L (ref 136–145)
TRIGL SERPL-MCNC: 304 MG/DL (ref 0–150)
VLDLC SERPL CALC-MCNC: 50 MG/DL (ref 5–40)

## 2023-05-07 DIAGNOSIS — R80.9 PROTEINURIA, UNSPECIFIED TYPE: ICD-10-CM

## 2023-05-07 DIAGNOSIS — N18.32 STAGE 3B CHRONIC KIDNEY DISEASE: Primary | ICD-10-CM

## 2023-05-07 NOTE — PROGRESS NOTES
Call results to patient.  kidney function decline within baseline, avoid non-steroidal anti-inflammatory drugs like alleve and motrin.  Does have high amount of protein in urine.  Return to check urine and further blood work.  I would also recommend start farxiga 10mg 1 po dialy #30 5 ref as can protect kidney function and reduce mortality, if covdred.  Let know if too expensive.  Vitamin D low - start calcitriol 0.25mcg 1 po daily #30 12 ref.

## 2023-05-09 DIAGNOSIS — E55.9 VITAMIN D DEFICIENCY: ICD-10-CM

## 2023-05-09 DIAGNOSIS — R73.01 IMPAIRED FASTING GLUCOSE: Primary | ICD-10-CM

## 2023-05-09 RX ORDER — CALCITRIOL 0.25 UG/1
0.25 CAPSULE, LIQUID FILLED ORAL DAILY
COMMUNITY
End: 2023-05-09 | Stop reason: SDUPTHER

## 2023-05-09 RX ORDER — CALCITRIOL 0.25 UG/1
0.25 CAPSULE, LIQUID FILLED ORAL DAILY
Qty: 30 CAPSULE | Refills: 12 | Status: SHIPPED | OUTPATIENT
Start: 2023-05-09

## 2023-06-16 DIAGNOSIS — E78.5 DYSLIPIDEMIA: ICD-10-CM

## 2023-06-16 RX ORDER — ROSUVASTATIN CALCIUM 20 MG/1
TABLET, COATED ORAL
Qty: 90 TABLET | Refills: 0 | Status: SHIPPED | OUTPATIENT
Start: 2023-06-16

## 2023-09-22 DIAGNOSIS — E78.5 DYSLIPIDEMIA: ICD-10-CM

## 2023-09-22 RX ORDER — ROSUVASTATIN CALCIUM 20 MG/1
TABLET, COATED ORAL
Qty: 90 TABLET | Refills: 0 | Status: SHIPPED | OUTPATIENT
Start: 2023-09-22

## 2024-03-08 DIAGNOSIS — R73.01 IMPAIRED FASTING GLUCOSE: ICD-10-CM

## 2024-03-08 RX ORDER — DAPAGLIFLOZIN 10 MG/1
1 TABLET, FILM COATED ORAL DAILY
Qty: 30 TABLET | Refills: 0 | Status: SHIPPED | OUTPATIENT
Start: 2024-03-08

## 2024-04-05 DIAGNOSIS — R73.01 IMPAIRED FASTING GLUCOSE: ICD-10-CM

## 2024-04-05 RX ORDER — DAPAGLIFLOZIN 10 MG/1
TABLET, FILM COATED ORAL
Qty: 30 TABLET | Refills: 0 | OUTPATIENT
Start: 2024-04-05

## 2024-04-10 DIAGNOSIS — R73.01 IMPAIRED FASTING GLUCOSE: ICD-10-CM

## 2024-04-10 RX ORDER — DAPAGLIFLOZIN 10 MG/1
TABLET, FILM COATED ORAL
Qty: 30 TABLET | Refills: 0 | OUTPATIENT
Start: 2024-04-10

## 2024-04-15 DIAGNOSIS — R73.01 IMPAIRED FASTING GLUCOSE: ICD-10-CM

## 2024-04-15 RX ORDER — DAPAGLIFLOZIN 10 MG/1
TABLET, FILM COATED ORAL
Qty: 30 TABLET | Refills: 0 | OUTPATIENT
Start: 2024-04-15

## 2024-04-19 DIAGNOSIS — R73.01 IMPAIRED FASTING GLUCOSE: ICD-10-CM

## 2024-04-19 RX ORDER — DAPAGLIFLOZIN 10 MG/1
TABLET, FILM COATED ORAL
Qty: 30 TABLET | Refills: 0 | OUTPATIENT
Start: 2024-04-19

## 2024-04-23 DIAGNOSIS — R73.01 IMPAIRED FASTING GLUCOSE: ICD-10-CM

## 2024-04-23 RX ORDER — DAPAGLIFLOZIN 10 MG/1
TABLET, FILM COATED ORAL
Qty: 30 TABLET | Refills: 0 | OUTPATIENT
Start: 2024-04-23

## 2025-08-20 ENCOUNTER — ON CAMPUS - OUTPATIENT (OUTPATIENT)
Dept: URBAN - METROPOLITAN AREA HOSPITAL 108 | Facility: HOSPITAL | Age: 70
End: 2025-08-20
Payer: MEDICARE

## 2025-08-20 DIAGNOSIS — K63.5 POLYP OF COLON: ICD-10-CM

## 2025-08-20 DIAGNOSIS — Z12.11 ENCOUNTER FOR SCREENING FOR MALIGNANT NEOPLASM OF COLON: ICD-10-CM

## 2025-08-20 PROCEDURE — 45380 COLONOSCOPY AND BIOPSY: CPT | Mod: PT | Performed by: INTERNAL MEDICINE
